# Patient Record
(demographics unavailable — no encounter records)

---

## 2024-10-22 NOTE — ASSESSMENT
[FreeTextEntry1] :  Mr. Kraft presents for initial evaluation of dysuria  Reports non-specific symptoms associated with diarrhea and groin pain UA: WNL, PVR: 400 showing incomplete bladder emptying (new diagnosis, uncertain prognosis) Reporting hesitancy and difficulty emptying bladder  Discussed the risks, benefits, alternatives, and possible side effects of anticholinergic therapy with the patient, including but not limited to dry eyes, dry mouth, constipation and mental status changes. We reviewed literature suggesting increased risk of developing dementia with long-term use and that patient would need to be regularly monitored on the medication.  Recommendadtions -Ucx -PSA -start tamsulosin 0.4 mg qhs -RTC 2-3 weeks for PVR check

## 2024-10-22 NOTE — PHYSICAL EXAM
[General Appearance - Well Developed] : well developed [General Appearance - Well Nourished] : well nourished [Abdomen Soft] : soft [Abdomen Tenderness] : non-tender [de-identified] : PVR: 400 cc

## 2024-10-22 NOTE — HISTORY OF PRESENT ILLNESS
[FreeTextEntry1] : 72M presents for initial evaluation of dysuria  Referred by Dr. Pierce   PMH significant for: HTN PSH significant for: nothing Significant meds: amlodipine  Reports history of diarrhea, abdominal pain, fevers, groin pain 2 weeks ago Presented to PCP who prescribed him cipro x 7 days for UTI No results available to review Reports resolution of most symptoms  Still c/o straining to urinate and urinary urgency Denies incontinence or hematuria  Daytime frequency: 5-6 Nighttime frequency: 1 Total Daily Fluid: 2-3 bottles Total Daily Caffeine: 1 cup IPSS 8; primarily obstructive symptoms PVR 400cc